# Patient Record
Sex: FEMALE | Race: WHITE | Employment: OTHER | URBAN - METROPOLITAN AREA
[De-identification: names, ages, dates, MRNs, and addresses within clinical notes are randomized per-mention and may not be internally consistent; named-entity substitution may affect disease eponyms.]

---

## 2017-03-01 ENCOUNTER — HOSPITAL ENCOUNTER (OUTPATIENT)
Dept: CT IMAGING | Facility: HOSPITAL | Age: 57
Discharge: HOME | End: 2017-03-01
Attending: INTERNAL MEDICINE

## 2017-03-01 DIAGNOSIS — Z13.6 SCREENING FOR ISCHEMIC HEART DISEASE: ICD-10-CM

## 2017-03-01 NOTE — IMAGING NOTE
DR CASEY, PLEASE BE AWARE THAT PT HAD A [facility] AWARE SCREENING PERFORMED WITH A CT CALCIUM SCORE OF 58.26 IN THE LAD. NO CALCIUM DETECTED IN THE RCA, LEFT MAIN OR CIRC.

## 2020-06-02 PROCEDURE — 88305 TISSUE EXAM BY PATHOLOGIST: CPT | Performed by: INTERNAL MEDICINE

## 2021-02-16 PROBLEM — I25.10 CORONARY ARTERY CALCIFICATION OF NATIVE ARTERY: Status: ACTIVE | Noted: 2021-02-16

## 2021-02-16 PROBLEM — I25.84 CORONARY ARTERY CALCIFICATION OF NATIVE ARTERY: Status: ACTIVE | Noted: 2021-02-16

## 2023-11-10 DIAGNOSIS — K21.9 LARYNGOPHARYNGEAL REFLUX DISEASE: Primary | ICD-10-CM

## 2023-11-20 ENCOUNTER — HOSPITAL ENCOUNTER (OUTPATIENT)
Dept: GENERAL RADIOLOGY | Age: 63
Discharge: HOME | End: 2023-11-20
Attending: OTOLARYNGOLOGY

## 2023-11-20 DIAGNOSIS — K21.9 LARYNGOPHARYNGEAL REFLUX DISEASE: ICD-10-CM

## 2023-11-20 PROCEDURE — 74221 X-RAY XM ESOPHAGUS 2CNTRST: CPT
